# Patient Record
Sex: MALE | Race: WHITE | NOT HISPANIC OR LATINO | URBAN - METROPOLITAN AREA
[De-identification: names, ages, dates, MRNs, and addresses within clinical notes are randomized per-mention and may not be internally consistent; named-entity substitution may affect disease eponyms.]

---

## 2020-11-26 ENCOUNTER — EMERGENCY (EMERGENCY)
Facility: HOSPITAL | Age: 30
LOS: 1 days | Discharge: HOME | End: 2020-11-26
Attending: EMERGENCY MEDICINE
Payer: COMMERCIAL

## 2020-11-26 VITALS
DIASTOLIC BLOOD PRESSURE: 75 MMHG | SYSTOLIC BLOOD PRESSURE: 151 MMHG | OXYGEN SATURATION: 98 % | TEMPERATURE: 99 F | WEIGHT: 179.9 LBS | HEIGHT: 71 IN | HEART RATE: 68 BPM | RESPIRATION RATE: 15 BRPM

## 2020-11-26 LAB — SARS-COV-2 RNA SPEC QL NAA+PROBE: SIGNIFICANT CHANGE UP

## 2020-11-26 PROCEDURE — U0003: CPT

## 2020-11-26 PROCEDURE — 99283 EMERGENCY DEPT VISIT LOW MDM: CPT

## 2020-11-26 NOTE — ED PROVIDER NOTE - PHYSICAL EXAMINATION
Vital signs noted, see flowsheet.  General: NAD, well appearing and non-toxic.  HEENT: NC/AT. MMM. No nasal discharge, throat without erythema or exudate. Conjunctiva and sclera clear b/l.  EOMI. PERRL.  Neck: Soft and supple, full ROM without pain. No meningeal signs  Cardiac: RRR. +S1/S2. Peripheral pulses 2+ and symmetric b/l. Capillary refill less than 2 seconds  Respiratory: Speaking in full sentences, no evidence of respiratory distress. Lungs CTA b/l, no wheezes/rhonchi/rales/stridor. No retractions or accessory muscle use. Ambulates without labored breathing  Abdomen: Soft, NTND. No guarding  Back: Spine midline and non-tender. No CVAT.  Skin: Normal color for race, no evidence of rash, ecchymosis, cyanosis or jaundice.   Neuro: Awake, alert and oriented to person/place/time/situation. Ambulatory with steady gait.

## 2020-11-26 NOTE — ED PROVIDER NOTE - CLINICAL SUMMARY MEDICAL DECISION MAKING FREE TEXT BOX
31 y/o M with no PMHx presents to ED requesting COVID swab. Pt flew in from [place] 4 days ago. Denies exposure to known COVID + persons. Denies any symptoms at this time.  -Will swab for COVID, advise self quarantine until results are available. Return for any worsening sxs

## 2020-11-26 NOTE — ED PROVIDER NOTE - ATTENDING CONTRIBUTION TO CARE
John: I performed a face to face bedside interview with patient regarding history of present illness, review of symptoms and past medical history. I completed an independent physical exam.  I have discussed patient's plan of care with advanced care provider.   I agree with note as stated above including HISTORY OF PRESENT ILLNESS, HIV, PAST MEDICAL/SURGICAL/FAMILY/SOCIAL HISTORY, ALLERGIES AND HOME MEDICATIONS, REVIEW OF SYSTEMS, PHYSICAL EXAM, MEDICAL DECISION MAKING and any PROGRESS NOTES during the time I functioned as the attending physician for this patient  unless otherwise noted. My brief assessment is as follows: traveled from [place], no symptoms, here for covid swab. well appearing, vs wnl, no resp distress. swab. return precautions and quarantine until results.

## 2020-11-26 NOTE — ED ADULT TRIAGE NOTE - CHIEF COMPLAINT QUOTE
pt arrive for covid test, came to [place] from [place] on Monday 11/23  cell phone # [phone] pt arrive for covid test, came to [place] from [place] on Monday 11/23. pt denies any covid related symptoms at this time.   cell phone # [phone]

## 2020-11-26 NOTE — ED PROVIDER NOTE - OBJECTIVE STATEMENT
31 y/o M with no PMHx presents to ED requesting COVID swab. Pt flew in from [place] 4 days ago. Denies exposure to known COVID + persons. Denies any symptoms at this time.

## 2020-11-26 NOTE — ED PROVIDER NOTE - PATIENT PORTAL LINK FT
You can access the FollowMyHealth Patient Portal offered by [facility] by registering at the following website: [email or web address]. By joining [facility]’s FollowMyHealth portal, you will also be able to view your health information using other applications (apps) compatible with our system.